# Patient Record
Sex: FEMALE | Race: WHITE | NOT HISPANIC OR LATINO | URBAN - METROPOLITAN AREA
[De-identification: names, ages, dates, MRNs, and addresses within clinical notes are randomized per-mention and may not be internally consistent; named-entity substitution may affect disease eponyms.]

---

## 2018-07-31 ENCOUNTER — OFFICE VISIT (OUTPATIENT)
Dept: URBAN - METROPOLITAN AREA CLINIC 193 | Facility: CLINIC | Age: 58
End: 2018-07-31
Payer: COMMERCIAL

## 2018-07-31 DIAGNOSIS — H52.4 PRESBYOPIA: Primary | ICD-10-CM

## 2018-07-31 PROCEDURE — 92012 INTRM OPH EXAM EST PATIENT: CPT | Performed by: OPTOMETRIST

## 2018-07-31 ASSESSMENT — INTRAOCULAR PRESSURE
OS: 13
OD: 13

## 2018-07-31 ASSESSMENT — VISUAL ACUITY
OS: 20/20-
OD: 20/20-

## 2024-01-24 NOTE — IMPRESSION/PLAN
Impression: Diagnosis: Presbyopia. Code: H52.4. declined DE again. Pt reports likes older int/near glasses. All are +1.25 add/+2.25 add. Intermediate tested +1.25 add today. Plan: A glasses prescription has been discussed and generated. Patient to call with any concerns. Again, recommend DE to eval choroidal nevus OD. Explained importance of DE.  Pt reports being too busy for DE. Lov: 12/04/23  Nov: 02/19/24